# Patient Record
Sex: MALE | Race: WHITE | URBAN - METROPOLITAN AREA
[De-identification: names, ages, dates, MRNs, and addresses within clinical notes are randomized per-mention and may not be internally consistent; named-entity substitution may affect disease eponyms.]

---

## 2019-10-11 ENCOUNTER — OFFICE VISIT (OUTPATIENT)
Dept: PULMONOLOGY | Age: 49
End: 2019-10-11

## 2019-10-11 VITALS
HEART RATE: 82 BPM | BODY MASS INDEX: 42.63 KG/M2 | HEIGHT: 69 IN | DIASTOLIC BLOOD PRESSURE: 97 MMHG | WEIGHT: 287.8 LBS | OXYGEN SATURATION: 94 % | TEMPERATURE: 98.3 F | SYSTOLIC BLOOD PRESSURE: 146 MMHG | RESPIRATION RATE: 17 BRPM

## 2019-10-11 DIAGNOSIS — G47.19 EXCESSIVE DAYTIME SLEEPINESS: ICD-10-CM

## 2019-10-11 DIAGNOSIS — K21.9 GASTROESOPHAGEAL REFLUX DISEASE, ESOPHAGITIS PRESENCE NOT SPECIFIED: ICD-10-CM

## 2019-10-11 DIAGNOSIS — I10 ESSENTIAL HYPERTENSION: ICD-10-CM

## 2019-10-11 DIAGNOSIS — T43.615D: ICD-10-CM

## 2019-10-11 DIAGNOSIS — R06.83 SNORING: Primary | ICD-10-CM

## 2019-10-11 DIAGNOSIS — R06.01 ORTHOPNEA: ICD-10-CM

## 2019-10-11 DIAGNOSIS — E66.01 MORBID OBESITY WITH BMI OF 40.0-44.9, ADULT (HCC): ICD-10-CM

## 2019-10-11 RX ORDER — CODEINE PHOSPHATE AND GUAIFENESIN 10; 100 MG/5ML; MG/5ML
5-10 SOLUTION ORAL
COMMUNITY
Start: 2019-10-08

## 2019-10-11 RX ORDER — LISINOPRIL 10 MG/1
10 TABLET ORAL
COMMUNITY

## 2019-10-11 RX ORDER — FLUTICASONE PROPIONATE 50 MCG
2 SPRAY, SUSPENSION (ML) NASAL DAILY
COMMUNITY

## 2019-10-11 RX ORDER — ALBUTEROL SULFATE 90 UG/1
1-2 AEROSOL, METERED RESPIRATORY (INHALATION)
COMMUNITY
Start: 2019-10-08

## 2019-10-11 RX ORDER — LIDOCAINE 50 MG/G
PATCH TOPICAL EVERY 24 HOURS
COMMUNITY

## 2019-10-11 RX ORDER — CETIRIZINE HCL 10 MG
TABLET ORAL
COMMUNITY
Start: 2019-09-15

## 2019-10-11 RX ORDER — RANITIDINE 150 MG/1
TABLET, FILM COATED ORAL
COMMUNITY
Start: 2019-09-18

## 2019-10-11 RX ORDER — AZITHROMYCIN 250 MG/1
TABLET, FILM COATED ORAL
COMMUNITY
Start: 2019-10-08

## 2019-10-11 NOTE — LETTER
10/13/19 Patient: Mary Cleaning YOB: 1970 Date of Visit: 10/11/2019 Padmaja Charlton DO 
[address] [address] VIA Facsimile: [phone] Dear Padmaja Charlton DO, Thank you for referring Mr. Grace Schaefer to [address] [place] [address] for evaluation. My notes for this consultation are attached. If you have questions, please do not hesitate to call me. I look forward to following your patient along with you.  
 
 
Sincerely, 
 
Huyen Clancy DO

## 2019-10-11 NOTE — PROGRESS NOTES
[facility]  Pulmonary, Critical Care, and Sleep Medicine    Office Progress Note: Initial Evaluation      Primary Care Physician: Milagros Garcia DO     Reason for Visit:  Evaluation for KRYSTIN and CPAP     Assessment:  1. Snoring with EDS: Patient has symptoms and exam findings highly suggestive of a sleep breathing disorder, such as KRYSTIN. Given severity of symptoms and comorbidities additional sleep testing is indicated. 2. GERD- controlled with Xantac  3. Orthopnea- suspect related to probable KRYSTIN- if not will need further medical evaluation  4. Caffeine use  5. Hypertension- BP elevated in clinic today, assymptomatic  6. Morbid Obesity: Body mass index is 42.5 kg/m². 7. H/O wheezing- undergoing evaluation at [facility]  8. H/O right cataract surgery - 12/2018           Plan:    · Schedule patient for  Home sleep study for further evaluation. · Potential consequences of untreated sleep apnea, and/or excessive daytime sleepiness were discussed with the patient. · Educational materials provided. · Treatment options including CPAP, dental appliance, weight reduction measures, positional therapy, surgeries etc were discussed. · Healthy lifestyle changes to include weight loss and exercise discussed. · Healthy sleep habits were reviewed and encouraged. ·  and workplace safety reviewed and discussed as appropriate. Drowsy and/or inattentive driving should be avoided. · Follow-up in after sleep testing, sooner should new symptoms or problems arise. · Follow up with Primary Care Provider (PCP) as directed and for routine health care maintenance. History of Present Illness: Mr. Saqib Connolly is a 47 y.o. male patient who presents for evaluation for possible sleep disorder. The history was provided by the patient. He reports undergoing a sleep study at [facility] in 2008. At this time our clinic has not been able to find those results. 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 Motor vehicle accident(s) associated with drowsy driving have not occurred. Snoring: This is a Chronic problem which has been ongoing for years. Witnessed apneas are not reported. Fatigue: This is a Chronic problem which has been ongoing for years. Dental: Teeth clenching or grinding is not reported. Naps: are reported and are refreshing most of the time    Leg Symptoms/Pain: He does not have unpleasant or crawling sensation in legs or strong urge to move when inactive. GERD: is reported and controlled with Zantac    Mood: Normal.    Sleep-Wake History:       Estimates sleeping approximately 4-6 hours per night/day. Reports sleeping in a Bed with 3-4 pillows. He gets into bed at approximately 3324-6170. Once in bed,he watches TV. It usually takes up to 30-90 minutes to fall asleep after going to bed. Pain, typically does not disturb their sleep. Vivid dreams are reported about 1-2 times monthly    Waking up with a morning headache is reported. 2-5 times monthly    Awakening with a dry mouth is not  reported. Symptom(s) suggestive of cataplexy are not reported. Sleep paralysis is reported but distant and no recent episodes    Hypnagogic and/or hypnopompic hallucinations are not reported. Sleep walking is not  reported. Sleep talking is not  reported. Other unusual and/or parasomnia behaviors are not reported. Family Sleep History:  N/A        Stop Critz Tomas 10/11/2019   Does the patient snore loudly (louder than talking or loud enough to be heard through closed doors)? 1   Does the patient often feel tired, fatigued, or sleepy during the daytime, even after a \"good\" night's sleep? 1   Has anyone ever observed the patient stop breathing during their sleep? 1   Does the patient have or are they being treated for high blood pressure? 1   Is the patient's BMI greater than 35? 1   Is your neck circumference greater than 17 inches (Male) or 16 inches (Female)? 1   Is the patient older than 48? 0   Is the patient male? 1   KRYSTIN Score 7       3 most recent PHQ Screens 10/11/2019   Little interest or pleasure in doing things Not at all   Feeling down, depressed, irritable, or hopeless Not at all   Total Score PHQ 2 0       [place] Scale 10/11/2019   Sitting and Reading 0   Watching TV 1   Sitting, inactive in a public place (e.g. a movie theater or meeting) 0   As a passenger in a car for an hour, without a break 1   Lying down to rest in the afternoon, when circumstances permit 2   Sitting and talking to someone 0   Sitting quietly after lunch without alcohol 0   In a car, while stopped for a few minutes in traffic 0   [place] Sleepiness Score 4        Neck circ. in \"inches\": 20         Past Medical History:  History reviewed. No pertinent past medical history. Past Surgical History:  Past Surgical History:   Procedure Laterality Date    HX CATARACT REMOVAL Right 12/2018       Family History:  Family History   Problem Relation Age of Onset    Cancer Maternal Grandmother     Ovarian Cancer Maternal Grandmother     Cancer Maternal Grandfather         Bone cancer    Cancer Paternal Grandfather         bone cancer       Social History:    Caffeine Amount Time of last Intake Comments   Coffee Rare     Soda 1 /week  [employer]    Tea 1 C/day     Energy Drinks 1/week  [place] [employer]   Over- the - counter stimulant pills Weight loss pill  Stopped Sep 2019   Other Substances      Alcohol Social     Tobacco None     Drugs None     Other: None         Medications:  Current Outpatient Medications on File Prior to Visit   Medication Sig Dispense Refill    cetirizine (ZYRTEC) 10 mg tablet       lisinopril (PRINIVIL, ZESTRIL) 10 mg tablet Take 10 mg by mouth.       azithromycin (ZITHROMAX) 250 mg tablet Take 2 tablets the first day, then 1 tablet daily for 4 days.  albuterol (PROAIR HFA) 90 mcg/actuation inhaler Take 1-2 Puffs by inhalation.  raNITIdine (ZANTAC) 150 mg tablet        No current facility-administered medications on file prior to visit. Allergy:  Not on File    Review of Systems  General ROS: positive for  - fatigue and sleep disturbance  negative for - chills, fever, hot flashes, malaise or night sweats  ENT ROS: negative for - epistaxis, hearing change, nasal congestion, nasal discharge, nasal polyps, oral lesions, sinus pain, sneezing, sore throat, tinnitus, vertigo or vocal changes  Hematological and Lymphatic ROS: negative for - bleeding problems, blood clots, bruising, jaundice, pallor or swollen lymph nodes  Endocrine ROS: negative for - polydipsia/polyuria, skin changes, temperature intolerance or unexpected weight changes  Respiratory ROS: positive for - shortness of breath and wheezing- going to [place] for pulmonary evaluation  negative for - cough, hemoptysis, sputum changes or stridor  Cardiovascular ROS: no chest pain or dyspnea on exertion  Gastrointestinal ROS: no abdominal pain, change in bowel habits, or black or bloody stools  Genito-Urinary ROS: no dysuria, trouble voiding, or hematuria  Musculoskeletal ROS: as above  Neurological ROS: no TIA or stroke symptoms  Dermatological ROS: negative for - pruritus, rash or skin lesion changes   Psychological ROS: As above   Otherwise negative. Physical Exam:  Blood pressure (!) 146/97, pulse 82, temperature 98.3 °F (36.8 °C), temperature source Oral, resp. rate 17, height 5' 9\" (1.753 m), weight 130.5 kg (287 lb 12.8 oz), SpO2 94 %. on RA, Body mass index is 42.5 kg/m².      General: No distress, acyanotic, appears stated age, cooperative, pleasant  HEENT: PERRL, EOMI, throat without erythema or exudate, Tongue- dental indention on tongue, Mallampati's score 3+, Uvula- midline, Tonsils- Not seen, Tympanic membranes are  Neck: Supple,  no abnormally enlarged lymph nodes, thyroid is not enlarged, non-tender, No JVD, No carotid bruits  Chest: Normal.  Lungs: Moderate air entry, clear to auscultation bilaterally- NO wheezing  Heart: Regular rate and rhythm, S1S2 present, without murmur. Abdomen: Protuberant, bowel sounds normoactive, abdomen is soft without significant tenderness, or guarding. Extremity: Negative for cyanosis, edema, or clubbing. Skin: Skin color, texture, turgor normal. No rashes or lesions. Neurological: CN 2-12 grossly intact, normal muscle tone. Data Reviewed:  CBC: No results found for: WBC, WBCLT, HGBPOC, HGB, HGBP, HCTPOC, HCT, PHCT, RBCH, PLT, MCV, HGBEXT, HCTEXT, PLTEXT    BMP: No results found for: NA, K, CL, CO2, AGAP, GLU, BUN, CREA, BUCR, GFRAA, GFRNA, CA, GFRAA     TSH:  No results found for: TSH, TSH2, TSH3, TSHP, TSHELE, TSHEXT    Imaging:  [x]I have personally reviewed the patients radiographs section   No results found for this or any previous visit. No results found for this or any previous visit.        Historical Sleep Testing Data:        Erica Olivo DO, [facility]  Pulmonary, Sleep and Critical Care Medicine

## 2019-10-11 NOTE — PROGRESS NOTES
Jayro Evans presents today for No chief complaint on file. Is someone accompanying this pt? No    Is the patient using any DME equipment during OV? No    -DME Company NA    Depression Screening:  No flowsheet data found. Learning Assessment:  No flowsheet data found. Abuse Screening:  No flowsheet data found. Fall Risk  No flowsheet data found. Coordination of Care:  1. Have you been to the ER, urgent care clinic since your last visit? Hospitalized since your last visit? No    2. Have you seen or consulted any other health care providers outside of the [address] [place] [address] since your last visit? Include any pap smears or colon screening. Dr. Joselyn Ellis PCP.

## 2019-10-29 ENCOUNTER — HOSPITAL ENCOUNTER (OUTPATIENT)
Dept: SLEEP MEDICINE | Age: 49
Discharge: HOME | End: 2019-10-29
Payer: OTHER GOVERNMENT

## 2019-10-29 DIAGNOSIS — K21.9 GASTROESOPHAGEAL REFLUX DISEASE, ESOPHAGITIS PRESENCE NOT SPECIFIED: ICD-10-CM

## 2019-10-29 DIAGNOSIS — I10 ESSENTIAL HYPERTENSION: ICD-10-CM

## 2019-10-29 DIAGNOSIS — R06.83 SNORING: ICD-10-CM

## 2019-10-29 DIAGNOSIS — R06.01 ORTHOPNEA: ICD-10-CM

## 2019-10-29 DIAGNOSIS — T43.615D: ICD-10-CM

## 2019-10-29 DIAGNOSIS — G47.19 EXCESSIVE DAYTIME SLEEPINESS: ICD-10-CM

## 2019-10-29 DIAGNOSIS — E66.01 MORBID OBESITY WITH BMI OF 40.0-44.9, ADULT (HCC): ICD-10-CM

## 2019-10-29 PROCEDURE — 95806 SLEEP STUDY UNATT&RESP EFFT: CPT

## 2019-10-30 ENCOUNTER — HOSPITAL ENCOUNTER (OUTPATIENT)
Dept: SLEEP MEDICINE | Age: 49
Discharge: HOME | End: 2019-10-30
Payer: OTHER GOVERNMENT

## 2019-10-30 DIAGNOSIS — I10 ESSENTIAL HYPERTENSION: ICD-10-CM

## 2019-10-30 DIAGNOSIS — G47.33 OSA (OBSTRUCTIVE SLEEP APNEA): Primary | ICD-10-CM

## 2019-10-30 DIAGNOSIS — R06.83 SNORING: ICD-10-CM

## 2019-10-30 DIAGNOSIS — G47.19 EXCESSIVE DAYTIME SLEEPINESS: ICD-10-CM

## 2019-10-30 NOTE — PROGRESS NOTES
The patient underwent home sleep testing on 10/29/19. Recommendations listed below. Please refer to full report for specific details. Interpretation   Mild Obstructive Sleep Apnea (KRYSTIN): AHI 8.9     The Oxygen Desaturation Index (TOM) was 10.9 and the SpO2 [place] for this study was  84% and average of 92%   The heart rate ranged between 52 and 98 bpm. The average was 68 bpm   Start APAP 5/15 cwp.  Other non-invasive treatment options are recommended were applicable and include  the following: weight reduction, smoking cessation, body position training, and modification of  alcohol ingestion and/or sedating agents.  Healthy sleep habit education and reinforcement will be reviewed with the patient.  Individuals are encouraged to obtain 7-9 hours of sleep per day.   safety is encouraged. Drowsy and/or inattentive driving should be avoided.  Follow up with referring provider as directed.       Shalini Woodard DO, FCCP    [facility]  Pulmonary, Critical Care, and Sleep Medicine

## 2019-11-06 ENCOUNTER — TELEPHONE (OUTPATIENT)
Dept: PULMONOLOGY | Age: 49
End: 2019-11-06

## 2021-12-03 ENCOUNTER — OFFICE VISIT (OUTPATIENT)
Dept: PULMONOLOGY | Age: 51
End: 2021-12-03
Payer: OTHER GOVERNMENT

## 2021-12-03 VITALS
OXYGEN SATURATION: 97 % | HEIGHT: 69 IN | WEIGHT: 303 LBS | HEART RATE: 69 BPM | TEMPERATURE: 97.7 F | RESPIRATION RATE: 14 BRPM | DIASTOLIC BLOOD PRESSURE: 86 MMHG | SYSTOLIC BLOOD PRESSURE: 142 MMHG | BODY MASS INDEX: 44.88 KG/M2

## 2021-12-03 DIAGNOSIS — G47.33 OSA ON CPAP: Primary | ICD-10-CM

## 2021-12-03 DIAGNOSIS — E66.01 MORBID OBESITY WITH BMI OF 40.0-44.9, ADULT (HCC): ICD-10-CM

## 2021-12-03 DIAGNOSIS — Z99.89 OSA ON CPAP: Primary | ICD-10-CM

## 2021-12-03 DIAGNOSIS — Z78.9 DIFFICULTY USING CONTINUOUS POSITIVE AIRWAY PRESSURE (CPAP) DEVICE: ICD-10-CM

## 2021-12-03 DIAGNOSIS — I10 HYPERTENSION, UNSPECIFIED TYPE: ICD-10-CM

## 2021-12-03 PROCEDURE — 99214 OFFICE O/P EST MOD 30 MIN: CPT | Performed by: INTERNAL MEDICINE

## 2021-12-03 RX ORDER — CYCLOBENZAPRINE HCL 10 MG
TABLET ORAL
COMMUNITY
Start: 2021-08-27

## 2021-12-03 RX ORDER — OMEPRAZOLE 20 MG/1
CAPSULE, DELAYED RELEASE ORAL
COMMUNITY
Start: 2021-11-16

## 2021-12-03 NOTE — PROGRESS NOTES
[facility]  Pulmonary, Critical Care, and Sleep Medicine    Office Progress Note      Primary Care Physician: John Vital DO     Reason for Visit: Follow up- KRYSTIN and CPAP, Last Clinic Visit: 10/11/2019    Assessment:  1. Obstructive Sleep Apnea (KRYSTIN): AHI: 5.9- Patient reports subjective improvement since starting PAP therapy. Current device is under recall  2. GERD- controlled with Prilosec  3. Rhinitis: Flonase and Zyrtec- currently controlled  4. Orthopnea- suspect related to probable KRYSTIN. Patient continues to sleep with 3 pillows under his head  5. Caffeine use: Does drink ocsassional 4 hour energy  6. Hypertension- BP elevated in clinic today, assymptomatic  7. Morbid Obesity: Body mass index is 44.75 kg/m². 8. Sciatica: With episodic flairs- currently doing well  9. H/O right cataract surgery - 12/2018           Plan:    · Continue with current PAP therapy- Risks to benefits of using recalled device discussed. Will try to get a replacement device for patient if possible. If patient is able to tolerate not using his PAP device he can try that but he is already sleeping on 3 pillows with PAP device. · Can try decreasing APAP from 5/15 to 5/12 and monitor for response  · Renew PAP supplies  · PAP compliance and hygiene reviewed. · Healthy lifestyle changes to include weight loss and exercise discussed. · Healthy sleep habits were reviewed and encouraged. ·  and workplace safety reviewed and discussed as appropriate. Drowsy and/or inattentive driving should be avoided. · Follow-up in 6 months, sooner should new symptoms or problems arise. · Follow up with Primary Care Provider (PCP) as directed and for routine health care maintenance. History of Present Illness: Mr. Norberto Wilde is a 45 y.o. male patient who presents for follow up of KRYSTIN and PAP therapy  The history was provided by the patient.   [facility] He was lost to follow up and represented to our clinic in 2019. He underwent a HSAT on 10/29/2019 which noted mild KRYSTIN. He was subsequently started on PAP therapy. The patient has not returned for follow up until today. Today the following is noted and/or reported:  · The patient reports that the device is working well for him. He feels more rested but not every night  · During most of September 2021, the patient reports that he was not able to use his device due to severe sciatica  · He states he tends to toss and turn most nights and that some nights he sleeps better than others  · He states that he feels more secure when sleeping at night with his PAP device. He reports that he was afraid he could die in his sleep. I reviewed his sleep study report again and provided reassurance   · He continues with some mild to moderate caffeine intake and an occassional 5 hour energy drink  · His typical sleep time ranges from 7278-1007 until 0545. · Rhinitis symptoms controlled with Zyrtec and Flonase  · He continues with his same job but has been working from home. · He rarely gets up at night to use the bathroom  · The patient does have a [employer] device that is under recall. 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 Motor vehicle accident(s) associated with drowsy driving have not occurred.         Family Sleep History:  N/A    Positive Airway Pressure (PAP) Compliance  Report & Summary Trends  DME: [employer]    Date >4 hr use % Time  (Total Time%) AHI PAP Rx Pressure @ 90% Average Use Time Large Leaks Notes   12/3/20-12/2/2021 78.4 (81.40 4.0 APAP 5.15 Flex: 2 8.2 07:08:45 00:00:13                                          Stop Mandy Hobson 10/11/2019 Does the patient snore loudly (louder than talking or loud enough to be heard through closed doors)? 1   Does the patient often feel tired, fatigued, or sleepy during the daytime, even after a \"good\" night's sleep? 1   Has anyone ever observed the patient stop breathing during their sleep? 1   Does the patient have or are they being treated for high blood pressure? 1   Is the patient's BMI greater than 35? 1   Is your neck circumference greater than 17 inches (Male) or 16 inches (Female)? 1   Is the patient older than 48? 0   Is the patient male?  1   KRYSTIN Score 7       3 most recent PHQ Screens 12/3/2021   Little interest or pleasure in doing things Not at all   Feeling down, depressed, irritable, or hopeless Not at all   Total Score PHQ 2 0       [place] Scale 12/3/2021 10/11/2019   Sitting and Reading 1 0   Watching TV 1 1   Sitting, inactive in a public place (e.g. a movie theater or meeting) 0 0   As a passenger in a car for an hour, without a break 1 1   Lying down to rest in the afternoon, when circumstances permit 1 2   Sitting and talking to someone 0 0   Sitting quietly after lunch without alcohol 0 0   In a car, while stopped for a few minutes in traffic 0 0   [place] Sleepiness Score 4 4           Immunization History   Administered Date(s) Administered    COVID-19, J&J, PF, 0.5 mL Dose 04/10/2021           Past Medical History:  Past Medical History:   Diagnosis Date    GERD (gastroesophageal reflux disease)     Hypertension     Sleep apnea        Past Surgical History:  Past Surgical History:   Procedure Laterality Date    HX CATARACT REMOVAL Right 12/2018       Family History:  Family History   Problem Relation Age of Onset    Cancer Maternal Grandmother     Ovarian Cancer Maternal Grandmother     Cancer Maternal Grandfather         Bone cancer    Cancer Paternal Grandfather         bone cancer       Social History:    Caffeine Amount Time of last Intake Comments   Coffee Rare     Soda 1 /week  [employer]    Tea 1 C/day     Energy Drinks 1/week  [place] [employer]   Over- the - counter stimulant pills Weight loss pill  Stopped Sep 2019   Other Substances      Alcohol Social     Tobacco None     Drugs None     Other: None         Medications:  Current Outpatient Medications on File Prior to Visit   Medication Sig Dispense Refill    cyclobenzaprine (FLEXERIL) 10 mg tablet       omeprazole (PRILOSEC) 20 mg capsule       cetirizine (ZYRTEC) 10 mg tablet       lisinopril (PRINIVIL, ZESTRIL) 10 mg tablet Take 10 mg by mouth.  albuterol (PROAIR HFA) 90 mcg/actuation inhaler Take 1-2 Puffs by inhalation.  fluticasone propionate (FLONASE) 50 mcg/actuation nasal spray 2 Sprays by Both Nostrils route daily.  lidocaine (LIDODERM) 5 % by TransDERmal route every twenty-four (24) hours. Apply patch to the affected area for 12 hours a day and remove for 12 hours a day.  azithromycin (ZITHROMAX) 250 mg tablet Take 2 tablets the first day, then 1 tablet daily for 4 days. (Patient not taking: Reported on 12/3/2021)      raNITIdine (ZANTAC) 150 mg tablet  (Patient not taking: Reported on 12/3/2021)      guaiFENesin-codeine (ROBITUSSIN AC) 100-10 mg/5 mL solution Take 5-10 mL by mouth every six (6) hours as needed. (Patient not taking: Reported on 12/3/2021)       No current facility-administered medications on file prior to visit.         Allergy:  Not on File    Review of Systems  General ROS: negative for - chills, fever, hot flashes, malaise or night sweats  ENT ROS: negative for - epistaxis, hearing change, nasal congestion, nasal discharge, nasal polyps, oral lesions, sinus pain, sneezing, sore throat, tinnitus, vertigo or vocal changes  Hematological and Lymphatic ROS: negative for - bleeding problems, blood clots, bruising, jaundice, pallor or swollen lymph nodes  Endocrine ROS: negative for - polydipsia/polyuria, skin changes, temperature intolerance or unexpected weight changes  Respiratory ROS: negative for - shortness of breath, cough or wheezing  negative for - cough, hemoptysis, sputum changes or stridor  Cardiovascular ROS: no chest pain or dyspnea on exertion  Gastrointestinal ROS: no abdominal pain, change in bowel habits, or black or bloody stools  Genito-Urinary ROS: no dysuria, trouble voiding, or hematuria  Musculoskeletal ROS: as above  Neurological ROS: no TIA or stroke symptoms  Dermatological ROS: negative for - pruritus, rash or skin lesion changes   Psychological ROS: negative  Otherwise negative. Physical Exam:  Blood pressure (!) 142/86, pulse 69, temperature 97.7 °F (36.5 °C), temperature source Temporal, resp. rate 14, height 5' 9\" (1.753 m), weight 137.4 kg (303 lb), SpO2 97 %. on RA, Body mass index is 44.75 kg/m². General: No distress, acyanotic, appears stated age, cooperative, pleasant  HEENT: PERRL, EOMI, throat without erythema or exudate, Tongue- dental indention on tongue, Mallampati's score 3+, Uvula- midline, Tonsils- Not seen   Neck: Supple,  no abnormally enlarged lymph nodes, thyroid is not enlarged, non-tender, No JVD, No carotid bruits  Chest: Grossly normal.  Lungs: Moderate air entry, clear to auscultation bilaterally- NO wheezing  Heart: Regular rate and rhythm, S1S2 present, without murmur. Abdomen: Protuberant,  abdomen is soft without significant tenderness, or guarding. Extremity: Negative for cyanosis, edema, or clubbing. Skin: Skin color, texture, turgor normal. No rashes or lesions. Neurological: CN 2-12 grossly intact, normal muscle tone.     Data Reviewed:  CBC: No results found for: WBC, WBCLT, HGBPOC, HGB, HGBP, HCTPOC, HCT, PHCT, RBCH, PLT, MCV, HGBEXT, HCTEXT, PLTEXT, HGBEXT, HCTEXT, PLTEXT    BMP: No results found for: NA, K, CL, CO2, AGAP, GLU, BUN, CREA, BUCR, GFRAA, GFRNA, CA, GFRAA     TSH:  No results found for: TSH, TSH2, TSH3, TSHP, TSHELE, TSHEXT, TSHEXT    Imaging:  [x]I have personally reviewed the patients radiographs section   No results found for this or any previous visit. No results found for this or any previous visit.          Historical Sleep Testing Data:        Isaac Olivera DO, [facility]  Pulmonary, Sleep and Critical Care Medicine

## 2021-12-03 NOTE — PATIENT INSTRUCTIONS
Please call our clinic back at [phone] if you have not received a follow up appointment within 30 days prior the recommended follow up time. Please also call our office if you are not tolerating treatment plan and/or if you are experiencing any difficulties with the [employer]  [employer] you may be using or is assigned to you. If you have a CPAP/BIPAP or home ventilator device, your DME company is supposed to provide you with replacement filters, tubing and masks. You can either call them when you need new supplies or you can arrange for an automatic shipment schedule. Your need to be seen by our office at least annually to renew the prescription for these supplies. Please make note of who your DME company is and their phone number. Please make sure that you clean your mask and hosing on a regular basis. Your DME can provide you with additional information regarding proper care and cleaning of your device- Thank you      Please be aware that your current PAP device may be under a general recall by the manufacture; [employer] Please refer to this following website: [employer]/healthcare/e/sleep/communications/src-update  Please look under the portion labeled, Patients, Users or Caregivers  and follow provided instructions.

## 2021-12-03 NOTE — LETTER
12/3/2021    Patient: Marcell Villar   YOB: 1970   Date of Visit: 12/3/2021     Norma Olvera, [address] [facility] [address]  Via Fax: [phone]    Dear Norma Olvera DO,      Thank you for referring Mr. Denise Vogel to [address] [place] [address] for evaluation. My notes for this consultation are attached. If you have questions, please do not hesitate to call me. I look forward to following your patient along with you.       Sincerely,    Kandy Ann DO